# Patient Record
Sex: MALE | URBAN - METROPOLITAN AREA
[De-identification: names, ages, dates, MRNs, and addresses within clinical notes are randomized per-mention and may not be internally consistent; named-entity substitution may affect disease eponyms.]

---

## 2022-11-11 ENCOUNTER — LAB OUTSIDE AN ENCOUNTER (OUTPATIENT)
Dept: URBAN - METROPOLITAN AREA CLINIC 31 | Facility: CLINIC | Age: 32
End: 2022-11-11

## 2022-11-11 ENCOUNTER — OFFICE VISIT (OUTPATIENT)
Dept: URBAN - METROPOLITAN AREA CLINIC 31 | Facility: CLINIC | Age: 32
End: 2022-11-11
Payer: COMMERCIAL

## 2022-11-11 VITALS
OXYGEN SATURATION: 98 % | HEART RATE: 70 BPM | WEIGHT: 195 LBS | BODY MASS INDEX: 31.34 KG/M2 | SYSTOLIC BLOOD PRESSURE: 120 MMHG | DIASTOLIC BLOOD PRESSURE: 74 MMHG | HEIGHT: 66 IN

## 2022-11-11 DIAGNOSIS — R10.13 EPIGASTRIC PAIN: ICD-10-CM

## 2022-11-11 DIAGNOSIS — R19.7 DIARRHEA, UNSPECIFIED TYPE: ICD-10-CM

## 2022-11-11 DIAGNOSIS — R12 HEARTBURN: ICD-10-CM

## 2022-11-11 DIAGNOSIS — R11.0 NAUSEA: ICD-10-CM

## 2022-11-11 PROCEDURE — 99204 OFFICE O/P NEW MOD 45 MIN: CPT | Performed by: INTERNAL MEDICINE

## 2022-11-11 PROCEDURE — 99244 OFF/OP CNSLTJ NEW/EST MOD 40: CPT | Performed by: INTERNAL MEDICINE

## 2022-11-11 NOTE — HPI-NEW PATIENT CONSULTATION
32 Patient presents today for consultation about acid reflux/heartburn and chest tightening.  Onset was two to three years ago and course has been persistent. Patient describes symptoms as a burning sensation and also has ABD pain and it is located at the central aspect of his ABD. Patient states changes in bowel habits as well. Pt admits symptoms began 2/3 years ago.  Patient admits one bowel movement a day with formed stools.  Admits taking recent antibiotics. Patient denies any recent stools studies. Patient denies blood in stools, melena, or mucus. Patient denies previous colonoscopy. Patient was previously dx with diverticulitis.   GI MD: heartburn and chest tightness before and after meals. Worse 3 weeks ago. No dysphagia.  +nausea. No emesis No SOB. Occasional cough. No hoarseness.  Occasional globus. No clearing of throat. He has tried Tums; helps some Diarrhea at least once a week then back to NL. Diarrhea can last 1-3 days; when present, will have few BM's a day. Occasional dark stool. +upper abdominal pain, mostly epigastric area No weight loss

## 2022-11-14 ENCOUNTER — CLAIMS CREATED FROM THE CLAIM WINDOW (OUTPATIENT)
Dept: URBAN - METROPOLITAN AREA CLINIC 4 | Facility: CLINIC | Age: 32
End: 2022-11-14
Payer: COMMERCIAL

## 2022-11-14 ENCOUNTER — TELEPHONE ENCOUNTER (OUTPATIENT)
Dept: URBAN - METROPOLITAN AREA CLINIC 35 | Facility: CLINIC | Age: 32
End: 2022-11-14

## 2022-11-14 ENCOUNTER — OFFICE VISIT (OUTPATIENT)
Dept: URBAN - METROPOLITAN AREA SURGERY CENTER 8 | Facility: SURGERY CENTER | Age: 32
End: 2022-11-14

## 2022-11-14 ENCOUNTER — CLAIMS CREATED FROM THE CLAIM WINDOW (OUTPATIENT)
Dept: URBAN - METROPOLITAN AREA SURGERY CENTER 8 | Facility: SURGERY CENTER | Age: 32
End: 2022-11-14
Payer: COMMERCIAL

## 2022-11-14 DIAGNOSIS — R10.13 ABDOMINAL DISCOMFORT, EPIGASTRIC: ICD-10-CM

## 2022-11-14 DIAGNOSIS — K31.7 POLYP OF STOMACH AND DUODENUM: ICD-10-CM

## 2022-11-14 DIAGNOSIS — K31.7 BENIGN GASTRIC POLYP: ICD-10-CM

## 2022-11-14 DIAGNOSIS — K31.89 ACQUIRED DEFORMITY OF DUODENUM: ICD-10-CM

## 2022-11-14 DIAGNOSIS — K21.9 ACID REFLUX: ICD-10-CM

## 2022-11-14 DIAGNOSIS — K31.89 OTHER DISEASES OF STOMACH AND DUODENUM: ICD-10-CM

## 2022-11-14 PROCEDURE — G8907 PT DOC NO EVENTS ON DISCHARG: HCPCS | Performed by: INTERNAL MEDICINE

## 2022-11-14 PROCEDURE — 88305 TISSUE EXAM BY PATHOLOGIST: CPT | Performed by: PATHOLOGY

## 2022-11-14 PROCEDURE — 88312 SPECIAL STAINS GROUP 1: CPT | Performed by: PATHOLOGY

## 2022-11-14 PROCEDURE — 43239 EGD BIOPSY SINGLE/MULTIPLE: CPT | Performed by: INTERNAL MEDICINE

## 2022-11-14 RX ORDER — FAMOTIDINE 40 MG/1
1 TABLET AT BEDTIME TABLET, FILM COATED ORAL ONCE A DAY
Qty: 30 | Refills: 0 | OUTPATIENT
Start: 2022-11-14

## 2022-11-14 RX ORDER — DEXLANSOPRAZOLE 60 MG/1
1 CAPSULE CAPSULE, DELAYED RELEASE ORAL
Qty: 90 | Refills: 0 | OUTPATIENT

## 2022-11-15 ENCOUNTER — TELEPHONE ENCOUNTER (OUTPATIENT)
Dept: URBAN - METROPOLITAN AREA CLINIC 35 | Facility: CLINIC | Age: 32
End: 2022-11-15

## 2022-11-15 RX ORDER — PANTOPRAZOLE SODIUM 40 MG/1
1 TABLET TABLET, DELAYED RELEASE ORAL
Qty: 90 | Refills: 1 | OUTPATIENT
Start: 2022-11-15

## 2022-11-16 ENCOUNTER — TELEPHONE ENCOUNTER (OUTPATIENT)
Dept: URBAN - METROPOLITAN AREA CLINIC 36 | Facility: CLINIC | Age: 32
End: 2022-11-16

## 2022-11-23 ENCOUNTER — TELEPHONE ENCOUNTER (OUTPATIENT)
Dept: URBAN - METROPOLITAN AREA CLINIC 36 | Facility: CLINIC | Age: 32
End: 2022-11-23

## 2022-12-13 ENCOUNTER — OFFICE VISIT (OUTPATIENT)
Dept: URBAN - METROPOLITAN AREA CLINIC 35 | Facility: CLINIC | Age: 32
End: 2022-12-13

## 2022-12-13 NOTE — HPI-TODAY'S VISIT:
Patient presents today for follow up to his/her EGD. Since the procedure patient _______ dysphagia, globus, changes in appetite, and changes in bowel habits.  Impressions:  Z-line regular, 39 cm from the incisors. - Non-obstructing Schatzki ring. - LA Grade B reflux esophagitis with no bleeding.  Biopsied. - Erythematous mucosa in the gastric body and antrum.  Biopsied. - Normal examined duodenum.  Biopsied.  Pathology: A. Duodenum, Second Part (D2), Biopsy: NO SIGNIFICANT ABNORMALITY. B. Stomach, Antrum and Body, Biopsy: PROTON PUMP INHIBITOR EFFECT WITH CHANGES SUGGESTIVE OF FUNDIC GLAND POLYP. No Evidence of H. Pylori Organism or Intestinal Metaplasia. Negative For Dysplasia or Malignancy. C. Esophagus, Lower-Third, Biopsy: SQUAMOCOLUMNAR MUCOSA WITH REFLUX-TYPE CHANGES. No Evidence of Shirley's Esophagus or Eosinophilic Esophagitis. Negative for Infectious organisms, Dysplasia or Malignancy.

## 2022-12-20 ENCOUNTER — TELEPHONE ENCOUNTER (OUTPATIENT)
Dept: URBAN - METROPOLITAN AREA CLINIC 35 | Facility: CLINIC | Age: 32
End: 2022-12-20

## 2022-12-20 ENCOUNTER — OFFICE VISIT (OUTPATIENT)
Dept: URBAN - METROPOLITAN AREA CLINIC 35 | Facility: CLINIC | Age: 32
End: 2022-12-20
Payer: COMMERCIAL

## 2022-12-20 DIAGNOSIS — R11.0 NAUSEA: ICD-10-CM

## 2022-12-20 DIAGNOSIS — R07.89 ATYPICAL CHEST PAIN: ICD-10-CM

## 2022-12-20 DIAGNOSIS — K21.00 GASTROESOPHAGEAL REFLUX DISEASE WITH ESOPHAGITIS WITHOUT HEMORRHAGE: ICD-10-CM

## 2022-12-20 DIAGNOSIS — R19.7 DIARRHEA, UNSPECIFIED TYPE: ICD-10-CM

## 2022-12-20 DIAGNOSIS — R10.13 EPIGASTRIC PAIN: ICD-10-CM

## 2022-12-20 PROCEDURE — 99214 OFFICE O/P EST MOD 30 MIN: CPT | Performed by: INTERNAL MEDICINE

## 2022-12-20 RX ORDER — DEXLANSOPRAZOLE 60 MG/1
1 CAPSULE CAPSULE, DELAYED RELEASE ORAL
Qty: 90 | Refills: 0 | Status: ON HOLD | COMMUNITY

## 2022-12-20 RX ORDER — FAMOTIDINE 40 MG/1
1 TABLET AT BEDTIME TABLET, FILM COATED ORAL ONCE A DAY
Qty: 30 | Refills: 0 | Status: ACTIVE | COMMUNITY
Start: 2022-11-14

## 2022-12-20 RX ORDER — PANTOPRAZOLE SODIUM 40 MG/1
1 TABLET TABLET, DELAYED RELEASE ORAL
Qty: 90 | Refills: 1 | Status: ACTIVE | COMMUNITY
Start: 2022-11-15

## 2022-12-20 RX ORDER — FAMOTIDINE 20 MG/1
1 TABLET AT BEDTIME TABLET, FILM COATED ORAL ONCE A DAY
Qty: 90 TABLET | Refills: 2 | OUTPATIENT
Start: 2022-12-20

## 2022-12-20 NOTE — HPI-TODAY'S VISIT:
Patient presents today for follow up to his EGD.  This is a Tele Visit.  Since the procedure patient denies dysphagia, globus, changes in appetite, and changes in bowel habits. Overall, patient is improved on Pantoprazole 40 mg daily in AM and Famotidine 40 mg at bedtime  Impression: - Z-line regular, 39 cm from the incisors. - Non-obstructing Schatzki ring. - LA Grade B reflux esophagitis with no bleeding.  Biopsied. - Erythematous mucosa in the gastric body and antrum.  Biopsied. - Normal examined duodenum.  Biopsied.   Pathology: A. Duodenum, Second Part (D2), Biopsy: NO SIGNIFICANT ABNORMALITY. B. Stomach, Antrum and Body, Biopsy: PROTON PUMP INHIBITOR EFFECT WITH CHANGES SUGGESTIVE OF FUNDIC GLAND POLYP. No Evidence of H. Pylori Organism or Intestinal Metaplasia. Negative For Dysplasia or Malignancy. C. Esophagus, Lower-Third, Biopsy: SQUAMOCOLUMNAR MUCOSA WITH REFLUX-TYPE CHANGES. No Evidence of Shirley's Esophagus or Eosinophilic Esophagitis. Negative for Infectious organisms, Dysplasia or Malignancy.   All stool studies were negative

## 2022-12-29 ENCOUNTER — TELEPHONE ENCOUNTER (OUTPATIENT)
Dept: URBAN - METROPOLITAN AREA CLINIC 35 | Facility: CLINIC | Age: 32
End: 2022-12-29

## 2023-02-24 ENCOUNTER — OFFICE VISIT (OUTPATIENT)
Dept: URBAN - METROPOLITAN AREA CLINIC 31 | Facility: CLINIC | Age: 33
End: 2023-02-24
Payer: COMMERCIAL

## 2023-02-24 ENCOUNTER — DASHBOARD ENCOUNTERS (OUTPATIENT)
Age: 33
End: 2023-02-24

## 2023-02-24 VITALS
SYSTOLIC BLOOD PRESSURE: 110 MMHG | OXYGEN SATURATION: 97 % | HEART RATE: 71 BPM | BODY MASS INDEX: 31.34 KG/M2 | DIASTOLIC BLOOD PRESSURE: 74 MMHG | HEIGHT: 66 IN | WEIGHT: 195 LBS

## 2023-02-24 DIAGNOSIS — K21.00 GASTROESOPHAGEAL REFLUX DISEASE WITH ESOPHAGITIS WITHOUT HEMORRHAGE: ICD-10-CM

## 2023-02-24 DIAGNOSIS — R10.13 EPIGASTRIC PAIN: ICD-10-CM

## 2023-02-24 DIAGNOSIS — R19.7 DIARRHEA, UNSPECIFIED TYPE: ICD-10-CM

## 2023-02-24 DIAGNOSIS — R07.89 ATYPICAL CHEST PAIN: ICD-10-CM

## 2023-02-24 DIAGNOSIS — R11.0 NAUSEA: ICD-10-CM

## 2023-02-24 PROBLEM — 266433003: Status: ACTIVE | Noted: 2022-12-20

## 2023-02-24 PROCEDURE — 99213 OFFICE O/P EST LOW 20 MIN: CPT | Performed by: INTERNAL MEDICINE

## 2023-02-24 RX ORDER — FAMOTIDINE 40 MG/1
1 TABLET AT BEDTIME TABLET, FILM COATED ORAL ONCE A DAY
Qty: 30 | Refills: 0 | Status: ON HOLD | COMMUNITY
Start: 2022-11-14

## 2023-02-24 RX ORDER — FAMOTIDINE 20 MG/1
1 TABLET AT BEDTIME TABLET, FILM COATED ORAL ONCE A DAY
Qty: 90 TABLET | Refills: 2 | Status: ACTIVE | COMMUNITY
Start: 2022-12-20

## 2023-02-24 RX ORDER — FAMOTIDINE 20 MG/1
1 TABLET AT BEDTIME TABLET, FILM COATED ORAL ONCE A DAY
Qty: 90 | Refills: 0
Start: 2022-12-20

## 2023-02-24 RX ORDER — PANTOPRAZOLE SODIUM 20 MG/1
1 TABLET TABLET, DELAYED RELEASE ORAL
Qty: 90 | Refills: 2 | OUTPATIENT
Start: 2023-02-24

## 2023-02-24 RX ORDER — DEXLANSOPRAZOLE 60 MG/1
1 CAPSULE CAPSULE, DELAYED RELEASE ORAL
Qty: 90 | Refills: 0 | Status: ON HOLD | COMMUNITY

## 2023-02-24 RX ORDER — PANTOPRAZOLE SODIUM 40 MG/1
1 TABLET TABLET, DELAYED RELEASE ORAL
Qty: 90 | Refills: 1 | Status: ACTIVE | COMMUNITY
Start: 2022-11-15

## 2023-02-24 NOTE — HPI-GERD
Patient present today for yearly follow-up of GERD. Patient denies any breakthrough episodes since his last office visit. He admits continued use of Pantoprazole 40 mg and Famotidine 20 mg with control symptoms.  Denies any recent episodes of dysphagia, globus, sour eructations, bloating/gas, indigestion, early satiety, changes in appetite, coughing, abdominal/epigastric pain, or changes in bowel habits.

## 2023-08-11 ENCOUNTER — OFFICE VISIT (OUTPATIENT)
Dept: URBAN - METROPOLITAN AREA CLINIC 31 | Facility: CLINIC | Age: 33
End: 2023-08-11

## 2023-08-11 ENCOUNTER — TELEPHONE ENCOUNTER (OUTPATIENT)
Dept: URBAN - METROPOLITAN AREA CLINIC 35 | Facility: CLINIC | Age: 33
End: 2023-08-11

## 2023-08-11 RX ORDER — FAMOTIDINE 20 MG/1
1 TABLET AT BEDTIME TABLET, FILM COATED ORAL ONCE A DAY
Qty: 90 TABLET | Refills: 3 | OUTPATIENT
Start: 2023-08-11

## 2023-08-11 RX ORDER — FAMOTIDINE 20 MG/1
1 TABLET AT BEDTIME TABLET, FILM COATED ORAL ONCE A DAY
Qty: 90 | Refills: 0

## 2023-08-11 RX ORDER — PANTOPRAZOLE SODIUM 20 MG/1
1 TABLET TABLET, DELAYED RELEASE ORAL
Qty: 90 | Refills: 2 | OUTPATIENT
Start: 2023-08-11

## 2023-08-11 RX ORDER — PANTOPRAZOLE SODIUM 20 MG/1
1 TABLET TABLET, DELAYED RELEASE ORAL
Qty: 90 | Refills: 2 | OUTPATIENT

## 2023-08-22 ENCOUNTER — OFFICE VISIT (OUTPATIENT)
Dept: URBAN - METROPOLITAN AREA CLINIC 35 | Facility: CLINIC | Age: 33
End: 2023-08-22

## 2023-08-22 RX ORDER — FAMOTIDINE 40 MG/1
1 TABLET AT BEDTIME TABLET, FILM COATED ORAL ONCE A DAY
Qty: 30 | Refills: 0 | Status: ON HOLD | COMMUNITY
Start: 2022-11-14

## 2023-08-22 RX ORDER — FAMOTIDINE 20 MG/1
1 TABLET AT BEDTIME TABLET, FILM COATED ORAL ONCE A DAY
Qty: 90 TABLET | Refills: 3 | Status: ACTIVE | COMMUNITY
Start: 2023-08-11

## 2023-08-22 RX ORDER — PANTOPRAZOLE SODIUM 20 MG/1
1 TABLET TABLET, DELAYED RELEASE ORAL
Qty: 90 | Refills: 2 | Status: ACTIVE | COMMUNITY
Start: 2023-08-11

## 2023-08-22 RX ORDER — FAMOTIDINE 20 MG/1
1 TABLET AT BEDTIME TABLET, FILM COATED ORAL ONCE A DAY
Qty: 90 | Refills: 0

## 2023-08-22 RX ORDER — PANTOPRAZOLE SODIUM 40 MG/1
1 TABLET TABLET, DELAYED RELEASE ORAL
Qty: 90 | Refills: 1 | Status: ACTIVE | COMMUNITY
Start: 2022-11-15

## 2023-08-22 RX ORDER — FAMOTIDINE 20 MG/1
1 TABLET AT BEDTIME TABLET, FILM COATED ORAL ONCE A DAY
Qty: 90 | Refills: 0 | Status: ACTIVE | COMMUNITY

## 2023-08-22 RX ORDER — DEXLANSOPRAZOLE 60 MG/1
1 CAPSULE CAPSULE, DELAYED RELEASE ORAL
Qty: 90 | Refills: 0 | Status: ON HOLD | COMMUNITY

## 2023-08-22 RX ORDER — PANTOPRAZOLE SODIUM 20 MG/1
1 TABLET TABLET, DELAYED RELEASE ORAL
Qty: 90 | Refills: 2 | Status: ACTIVE | COMMUNITY

## 2023-08-22 RX ORDER — PANTOPRAZOLE SODIUM 20 MG/1
1 TABLET TABLET, DELAYED RELEASE ORAL
Qty: 90 | Refills: 2 | OUTPATIENT

## 2023-10-17 ENCOUNTER — OFFICE VISIT (OUTPATIENT)
Dept: URBAN - METROPOLITAN AREA CLINIC 35 | Facility: CLINIC | Age: 33
End: 2023-10-17